# Patient Record
Sex: MALE | Race: WHITE | NOT HISPANIC OR LATINO | Employment: FULL TIME | URBAN - METROPOLITAN AREA
[De-identification: names, ages, dates, MRNs, and addresses within clinical notes are randomized per-mention and may not be internally consistent; named-entity substitution may affect disease eponyms.]

---

## 2021-05-25 DIAGNOSIS — Z11.59 ENCOUNTER FOR SCREENING FOR OTHER VIRAL DISEASES: ICD-10-CM

## 2021-06-11 DIAGNOSIS — Z11.59 ENCOUNTER FOR SCREENING FOR OTHER VIRAL DISEASES: ICD-10-CM

## 2021-06-11 LAB
LABORATORY COMMENT REPORT: NORMAL
SARS-COV-2 RNA RESP QL NAA+PROBE: NEGATIVE
SARS-COV-2 RNA RESP QL NAA+PROBE: NORMAL
SPECIMEN SOURCE: NORMAL
SPECIMEN SOURCE: NORMAL

## 2021-06-11 PROCEDURE — U0003 INFECTIOUS AGENT DETECTION BY NUCLEIC ACID (DNA OR RNA); SEVERE ACUTE RESPIRATORY SYNDROME CORONAVIRUS 2 (SARS-COV-2) (CORONAVIRUS DISEASE [COVID-19]), AMPLIFIED PROBE TECHNIQUE, MAKING USE OF HIGH THROUGHPUT TECHNOLOGIES AS DESCRIBED BY CMS-2020-01-R: HCPCS | Performed by: UROLOGY

## 2021-06-11 PROCEDURE — U0005 INFEC AGEN DETEC AMPLI PROBE: HCPCS | Performed by: UROLOGY

## 2021-06-14 RX ORDER — TAMSULOSIN HYDROCHLORIDE 0.4 MG/1
0.4 CAPSULE ORAL EVERY MORNING
COMMUNITY

## 2021-06-14 RX ORDER — FINASTERIDE 5 MG/1
5 TABLET, FILM COATED ORAL EVERY MORNING
COMMUNITY

## 2021-06-14 RX ORDER — ATORVASTATIN CALCIUM 20 MG/1
20 TABLET, FILM COATED ORAL EVERY EVENING
COMMUNITY

## 2021-06-14 NOTE — PROGRESS NOTES
PTA medications updated by Medication Scribe prior to surgery via phone call with patient (last doses completed by Nurse)     Medication history sources: Patient, Surescripts and H&P  In the past week, patient estimated taking medication this percent of the time: Greater than 90%  Adherence assessment: N/A Not Observed    Significant changes made to the medication list:  None      Additional medication history information:   None    Medication reconciliation completed by provider prior to medication history? No    Time spent in this activity: 15 minutes    The information provided in this note is only as accurate as the sources available at the time of update(s)      Prior to Admission medications    Medication Sig Last Dose Taking? Auth Provider   Ascorbic Acid (VITAMIN C PO) Take 1 Dose by mouth daily 6/10/2021 Yes Reported, Patient   atorvastatin (LIPITOR) 20 MG tablet Take 20 mg by mouth every evening  at pm Yes Reported, Patient   CRANBERRY PO Take 1 Dose by mouth daily 6/10/2021 Yes Reported, Patient   finasteride (PROSCAR) 5 MG tablet Take 5 mg by mouth every morning  at am Yes Reported, Patient   hydrochlorothiazide (MICROZIDE) 12.5 MG capsule Take 12.5 mg by mouth every morning   at am Yes Reported, Patient   lisinopril (ZESTRIL) 5 MG tablet Take 5 mg by mouth daily  6/14/2021 at am Yes Reported, Patient   tamsulosin (FLOMAX) 0.4 MG capsule Take 0.4 mg by mouth every morning  at am Yes Reported, Patient

## 2021-06-15 ENCOUNTER — ANESTHESIA EVENT (OUTPATIENT)
Dept: SURGERY | Facility: CLINIC | Age: 51
End: 2021-06-15
Payer: COMMERCIAL

## 2021-06-15 ENCOUNTER — ANESTHESIA (OUTPATIENT)
Dept: SURGERY | Facility: CLINIC | Age: 51
End: 2021-06-15
Payer: COMMERCIAL

## 2021-06-15 ENCOUNTER — HOSPITAL ENCOUNTER (OUTPATIENT)
Facility: CLINIC | Age: 51
Discharge: HOME | End: 2021-06-16
Attending: UROLOGY | Admitting: UROLOGY
Payer: COMMERCIAL

## 2021-06-15 DIAGNOSIS — N32.0 BLADDER NECK OBSTRUCTION: Primary | ICD-10-CM

## 2021-06-15 LAB — POTASSIUM SERPL-SCNC: 3.5 MMOL/L (ref 3.4–5.3)

## 2021-06-15 PROCEDURE — 250N000011 HC RX IP 250 OP 636: Performed by: NURSE ANESTHETIST, CERTIFIED REGISTERED

## 2021-06-15 PROCEDURE — 250N000011 HC RX IP 250 OP 636: Performed by: ANESTHESIOLOGY

## 2021-06-15 PROCEDURE — 84132 ASSAY OF SERUM POTASSIUM: CPT | Performed by: ANESTHESIOLOGY

## 2021-06-15 PROCEDURE — 370N000017 HC ANESTHESIA TECHNICAL FEE, PER MIN: Performed by: UROLOGY

## 2021-06-15 PROCEDURE — 258N000003 HC RX IP 258 OP 636: Performed by: ANESTHESIOLOGY

## 2021-06-15 PROCEDURE — 88305 TISSUE EXAM BY PATHOLOGIST: CPT | Mod: 26 | Performed by: PATHOLOGY

## 2021-06-15 PROCEDURE — 250N000011 HC RX IP 250 OP 636: Performed by: PHYSICIAN ASSISTANT

## 2021-06-15 PROCEDURE — 710N000009 HC RECOVERY PHASE 1, LEVEL 1, PER MIN: Performed by: UROLOGY

## 2021-06-15 PROCEDURE — 250N000009 HC RX 250: Performed by: UROLOGY

## 2021-06-15 PROCEDURE — 36415 COLL VENOUS BLD VENIPUNCTURE: CPT | Performed by: ANESTHESIOLOGY

## 2021-06-15 PROCEDURE — C1769 GUIDE WIRE: HCPCS | Performed by: UROLOGY

## 2021-06-15 PROCEDURE — 258N000003 HC RX IP 258 OP 636: Performed by: NURSE ANESTHETIST, CERTIFIED REGISTERED

## 2021-06-15 PROCEDURE — 999N000141 HC STATISTIC PRE-PROCEDURE NURSING ASSESSMENT: Performed by: UROLOGY

## 2021-06-15 PROCEDURE — 360N000076 HC SURGERY LEVEL 3, PER MIN: Performed by: UROLOGY

## 2021-06-15 PROCEDURE — 250N000025 HC SEVOFLURANE, PER MIN: Performed by: UROLOGY

## 2021-06-15 PROCEDURE — 88305 TISSUE EXAM BY PATHOLOGIST: CPT | Mod: TC | Performed by: UROLOGY

## 2021-06-15 PROCEDURE — 258N000003 HC RX IP 258 OP 636: Performed by: UROLOGY

## 2021-06-15 PROCEDURE — 272N000001 HC OR GENERAL SUPPLY STERILE: Performed by: UROLOGY

## 2021-06-15 PROCEDURE — 250N000009 HC RX 250: Performed by: NURSE ANESTHETIST, CERTIFIED REGISTERED

## 2021-06-15 RX ORDER — SODIUM CHLORIDE, SODIUM LACTATE, POTASSIUM CHLORIDE, CALCIUM CHLORIDE 600; 310; 30; 20 MG/100ML; MG/100ML; MG/100ML; MG/100ML
INJECTION, SOLUTION INTRAVENOUS CONTINUOUS PRN
Status: DISCONTINUED | OUTPATIENT
Start: 2021-06-15 | End: 2021-06-15

## 2021-06-15 RX ORDER — BUPIVACAINE HYDROCHLORIDE 7.5 MG/ML
INJECTION, SOLUTION INTRASPINAL
Status: COMPLETED | OUTPATIENT
Start: 2021-06-15 | End: 2021-06-15

## 2021-06-15 RX ORDER — HYDRALAZINE HYDROCHLORIDE 20 MG/ML
2.5-5 INJECTION INTRAMUSCULAR; INTRAVENOUS EVERY 10 MIN PRN
Status: DISCONTINUED | OUTPATIENT
Start: 2021-06-15 | End: 2021-06-15 | Stop reason: HOSPADM

## 2021-06-15 RX ORDER — PROPOFOL 10 MG/ML
INJECTION, EMULSION INTRAVENOUS CONTINUOUS PRN
Status: DISCONTINUED | OUTPATIENT
Start: 2021-06-15 | End: 2021-06-15

## 2021-06-15 RX ORDER — SODIUM CHLORIDE, SODIUM LACTATE, POTASSIUM CHLORIDE, CALCIUM CHLORIDE 600; 310; 30; 20 MG/100ML; MG/100ML; MG/100ML; MG/100ML
INJECTION, SOLUTION INTRAVENOUS CONTINUOUS
Status: DISCONTINUED | OUTPATIENT
Start: 2021-06-15 | End: 2021-06-16 | Stop reason: HOSPADM

## 2021-06-15 RX ORDER — LIDOCAINE 40 MG/G
CREAM TOPICAL
Status: DISCONTINUED | OUTPATIENT
Start: 2021-06-15 | End: 2021-06-16 | Stop reason: HOSPADM

## 2021-06-15 RX ORDER — ONDANSETRON 4 MG/1
4 TABLET, ORALLY DISINTEGRATING ORAL EVERY 30 MIN PRN
Status: DISCONTINUED | OUTPATIENT
Start: 2021-06-15 | End: 2021-06-15 | Stop reason: HOSPADM

## 2021-06-15 RX ORDER — LABETALOL HYDROCHLORIDE 5 MG/ML
10 INJECTION, SOLUTION INTRAVENOUS
Status: DISCONTINUED | OUTPATIENT
Start: 2021-06-15 | End: 2021-06-15 | Stop reason: HOSPADM

## 2021-06-15 RX ORDER — MEPERIDINE HYDROCHLORIDE 25 MG/ML
12.5 INJECTION INTRAMUSCULAR; INTRAVENOUS; SUBCUTANEOUS EVERY 5 MIN PRN
Status: DISCONTINUED | OUTPATIENT
Start: 2021-06-15 | End: 2021-06-15 | Stop reason: HOSPADM

## 2021-06-15 RX ORDER — NALOXONE HYDROCHLORIDE 0.4 MG/ML
0.4 INJECTION, SOLUTION INTRAMUSCULAR; INTRAVENOUS; SUBCUTANEOUS
Status: DISCONTINUED | OUTPATIENT
Start: 2021-06-15 | End: 2021-06-15

## 2021-06-15 RX ORDER — NALOXONE HYDROCHLORIDE 0.4 MG/ML
0.2 INJECTION, SOLUTION INTRAMUSCULAR; INTRAVENOUS; SUBCUTANEOUS
Status: DISCONTINUED | OUTPATIENT
Start: 2021-06-15 | End: 2021-06-16 | Stop reason: HOSPADM

## 2021-06-15 RX ORDER — ONDANSETRON 2 MG/ML
INJECTION INTRAMUSCULAR; INTRAVENOUS PRN
Status: DISCONTINUED | OUTPATIENT
Start: 2021-06-15 | End: 2021-06-15

## 2021-06-15 RX ORDER — CEFAZOLIN SODIUM 2 G/100ML
2 INJECTION, SOLUTION INTRAVENOUS
Status: COMPLETED | OUTPATIENT
Start: 2021-06-15 | End: 2021-06-15

## 2021-06-15 RX ORDER — ATROPA BELLADONNA AND OPIUM 16.2; 3 MG/1; MG/1
SUPPOSITORY RECTAL PRN
Status: DISCONTINUED | OUTPATIENT
Start: 2021-06-15 | End: 2021-06-15 | Stop reason: HOSPADM

## 2021-06-15 RX ORDER — SODIUM CHLORIDE, SODIUM LACTATE, POTASSIUM CHLORIDE, CALCIUM CHLORIDE 600; 310; 30; 20 MG/100ML; MG/100ML; MG/100ML; MG/100ML
INJECTION, SOLUTION INTRAVENOUS CONTINUOUS
Status: DISCONTINUED | OUTPATIENT
Start: 2021-06-15 | End: 2021-06-15 | Stop reason: HOSPADM

## 2021-06-15 RX ORDER — KETOROLAC TROMETHAMINE 30 MG/ML
30 INJECTION, SOLUTION INTRAMUSCULAR; INTRAVENOUS EVERY 6 HOURS PRN
Status: DISCONTINUED | OUTPATIENT
Start: 2021-06-15 | End: 2021-06-16 | Stop reason: HOSPADM

## 2021-06-15 RX ORDER — NALOXONE HYDROCHLORIDE 0.4 MG/ML
0.4 INJECTION, SOLUTION INTRAMUSCULAR; INTRAVENOUS; SUBCUTANEOUS
Status: DISCONTINUED | OUTPATIENT
Start: 2021-06-15 | End: 2021-06-16 | Stop reason: HOSPADM

## 2021-06-15 RX ORDER — EPHEDRINE SULFATE 50 MG/ML
INJECTION, SOLUTION INTRAMUSCULAR; INTRAVENOUS; SUBCUTANEOUS PRN
Status: DISCONTINUED | OUTPATIENT
Start: 2021-06-15 | End: 2021-06-15

## 2021-06-15 RX ORDER — ACETAMINOPHEN 325 MG/1
650 TABLET ORAL EVERY 6 HOURS PRN
Status: DISCONTINUED | OUTPATIENT
Start: 2021-06-15 | End: 2021-06-16 | Stop reason: HOSPADM

## 2021-06-15 RX ORDER — ONDANSETRON 2 MG/ML
4 INJECTION INTRAMUSCULAR; INTRAVENOUS EVERY 6 HOURS PRN
Status: DISCONTINUED | OUTPATIENT
Start: 2021-06-15 | End: 2021-06-16 | Stop reason: HOSPADM

## 2021-06-15 RX ORDER — FENTANYL CITRATE 50 UG/ML
25-50 INJECTION, SOLUTION INTRAMUSCULAR; INTRAVENOUS
Status: DISCONTINUED | OUTPATIENT
Start: 2021-06-15 | End: 2021-06-15 | Stop reason: HOSPADM

## 2021-06-15 RX ORDER — HYDROMORPHONE HYDROCHLORIDE 1 MG/ML
.3-.5 INJECTION, SOLUTION INTRAMUSCULAR; INTRAVENOUS; SUBCUTANEOUS EVERY 5 MIN PRN
Status: DISCONTINUED | OUTPATIENT
Start: 2021-06-15 | End: 2021-06-15 | Stop reason: HOSPADM

## 2021-06-15 RX ORDER — HYDROCODONE BITARTRATE AND ACETAMINOPHEN 5; 325 MG/1; MG/1
1-2 TABLET ORAL EVERY 4 HOURS PRN
Status: DISCONTINUED | OUTPATIENT
Start: 2021-06-15 | End: 2021-06-16 | Stop reason: HOSPADM

## 2021-06-15 RX ORDER — ONDANSETRON 4 MG/1
4 TABLET, ORALLY DISINTEGRATING ORAL EVERY 6 HOURS PRN
Status: DISCONTINUED | OUTPATIENT
Start: 2021-06-15 | End: 2021-06-16 | Stop reason: HOSPADM

## 2021-06-15 RX ORDER — FENTANYL CITRATE 50 UG/ML
25-100 INJECTION, SOLUTION INTRAMUSCULAR; INTRAVENOUS
Status: DISCONTINUED | OUTPATIENT
Start: 2021-06-15 | End: 2021-06-15 | Stop reason: HOSPADM

## 2021-06-15 RX ORDER — LIDOCAINE 40 MG/G
CREAM TOPICAL
Status: DISCONTINUED | OUTPATIENT
Start: 2021-06-15 | End: 2021-06-15 | Stop reason: HOSPADM

## 2021-06-15 RX ORDER — ONDANSETRON 2 MG/ML
4 INJECTION INTRAMUSCULAR; INTRAVENOUS EVERY 30 MIN PRN
Status: DISCONTINUED | OUTPATIENT
Start: 2021-06-15 | End: 2021-06-15 | Stop reason: HOSPADM

## 2021-06-15 RX ORDER — NALOXONE HYDROCHLORIDE 0.4 MG/ML
0.2 INJECTION, SOLUTION INTRAMUSCULAR; INTRAVENOUS; SUBCUTANEOUS
Status: DISCONTINUED | OUTPATIENT
Start: 2021-06-15 | End: 2021-06-15

## 2021-06-15 RX ADMIN — MIDAZOLAM 2 MG: 1 INJECTION INTRAMUSCULAR; INTRAVENOUS at 07:29

## 2021-06-15 RX ADMIN — PROPOFOL 100 MCG/KG/MIN: 10 INJECTION, EMULSION INTRAVENOUS at 07:38

## 2021-06-15 RX ADMIN — Medication 0.5 UNITS: at 08:29

## 2021-06-15 RX ADMIN — PHENYLEPHRINE HYDROCHLORIDE 200 MCG: 10 INJECTION INTRAVENOUS at 08:09

## 2021-06-15 RX ADMIN — CEFAZOLIN SODIUM 2 G: 2 INJECTION, SOLUTION INTRAVENOUS at 07:39

## 2021-06-15 RX ADMIN — Medication 0.5 UNITS: at 08:19

## 2021-06-15 RX ADMIN — BUPIVACAINE HYDROCHLORIDE IN DEXTROSE 1.4 ML: 7.5 INJECTION, SOLUTION SUBARACHNOID at 08:06

## 2021-06-15 RX ADMIN — PHENYLEPHRINE HYDROCHLORIDE 100 MCG: 10 INJECTION INTRAVENOUS at 07:53

## 2021-06-15 RX ADMIN — ONDANSETRON 4 MG: 2 INJECTION INTRAMUSCULAR; INTRAVENOUS at 07:55

## 2021-06-15 RX ADMIN — Medication 0.5 UNITS: at 08:23

## 2021-06-15 RX ADMIN — PHENYLEPHRINE HYDROCHLORIDE 200 MCG: 10 INJECTION INTRAVENOUS at 07:59

## 2021-06-15 RX ADMIN — PHENYLEPHRINE HYDROCHLORIDE 200 MCG: 10 INJECTION INTRAVENOUS at 08:07

## 2021-06-15 RX ADMIN — Medication 10 MG: at 08:15

## 2021-06-15 RX ADMIN — SODIUM CHLORIDE, POTASSIUM CHLORIDE, SODIUM LACTATE AND CALCIUM CHLORIDE: 600; 310; 30; 20 INJECTION, SOLUTION INTRAVENOUS at 19:18

## 2021-06-15 RX ADMIN — SODIUM CHLORIDE, POTASSIUM CHLORIDE, SODIUM LACTATE AND CALCIUM CHLORIDE: 600; 310; 30; 20 INJECTION, SOLUTION INTRAVENOUS at 10:49

## 2021-06-15 RX ADMIN — SODIUM CHLORIDE, POTASSIUM CHLORIDE, SODIUM LACTATE AND CALCIUM CHLORIDE: 600; 310; 30; 20 INJECTION, SOLUTION INTRAVENOUS at 12:28

## 2021-06-15 RX ADMIN — SODIUM CHLORIDE, POTASSIUM CHLORIDE, SODIUM LACTATE AND CALCIUM CHLORIDE: 600; 310; 30; 20 INJECTION, SOLUTION INTRAVENOUS at 07:27

## 2021-06-15 RX ADMIN — Medication 10 MG: at 08:05

## 2021-06-15 RX ADMIN — FENTANYL CITRATE 50 MCG: 50 INJECTION, SOLUTION INTRAMUSCULAR; INTRAVENOUS at 07:29

## 2021-06-15 RX ADMIN — Medication 0.5 UNITS: at 08:27

## 2021-06-15 ASSESSMENT — MIFFLIN-ST. JEOR: SCORE: 1934.94

## 2021-06-15 NOTE — ANESTHESIA PROCEDURE NOTES
Intrathecal Procedure Note  Pre-Procedure   Staff -        Anesthesiologist:  Francisco Bell MD       Performed By: anesthesiologist       Location: OR       Pre-Anesthestic Checklist: patient identified, IV checked, risks and benefits discussed, informed consent, monitors and equipment checked and pre-op evaluation  Timeout:       Correct Patient: Yes        Correct Procedure: Yes        Correct Position: Yes   Procedure Documentation  Procedure: intrathecal       Patient Position: sitting       Patient Prep/Sterile Barriers: sterile gloves, mask, patient draped, 3 rounds of betadine.  Waited for it to completely dry prior to procedure       Skin prep: Betadine       Insertion Site: L4-5. (midline approach).       Spinal Needle Type: Laura-Luke       Introducer used      # of attempts: 1 and  # of redirects:     Assessment/Narrative         Paresthesias: No.       CSF fluid: clear.    Medication(s) Administered   0.75% Hyperbaric Bupivacaine (Intrathecal), 1.4 mL  Comments:  1% lidocaine to skin  No complications

## 2021-06-15 NOTE — ANESTHESIA CARE TRANSFER NOTE
Patient: Yosvany Mcdonnell    Procedure(s):  TRANSURETHRAL RESECTION OF PROSTATE    Diagnosis: Chadwick hematuria [R31.0]  Diagnosis Additional Information: No value filed.    Anesthesia Type:   Spinal     Note:    Oropharynx: oropharynx clear of all foreign objects  Level of Consciousness: awake  Oxygen Supplementation: face mask  Level of Supplemental Oxygen (L/min / FiO2): 6  Independent Airway: airway patency satisfactory and stable  Dentition: dentition unchanged  Vital Signs Stable: post-procedure vital signs reviewed and stable  Report to RN Given: handoff report given  Patient transferred to: PACU  Comments: Pt exhibits spont resps, all monitors and alarms on in pacu, report given to RN, vss.  Handoff Report: Identifed the Patient, Identified the Reponsible Provider, Reviewed the pertinent medical history, Discussed the surgical course, Reviewed Intra-OP anesthesia mangement and issues during anesthesia, Set expectations for post-procedure period and Allowed opportunity for questions and acknowledgement of understanding      Vitals: (Last set prior to Anesthesia Care Transfer)  CRNA VITALS  6/15/2021 0801 - 6/15/2021 0838      6/15/2021             NIBP:  116/52    NIBP Mean:  62    Resp Rate (set):  10        Electronically Signed By: LESLIE Sosa CRNA  Rochelle 15, 2021  8:38 AM

## 2021-06-15 NOTE — ANESTHESIA POSTPROCEDURE EVALUATION
Patient: Yosvany Mcdonnell    Procedure(s):  TRANSURETHRAL RESECTION OF PROSTATE    Diagnosis:Chadwick hematuria [R31.0]  Diagnosis Additional Information: No value filed.    Anesthesia Type:  Spinal    Note:  Disposition: Inpatient   Postop Pain Control: Uneventful            Sign Out: Well controlled pain   PONV: No   Neuro/Psych: Uneventful            Sign Out: Acceptable/Baseline neuro status   Airway/Respiratory: Uneventful            Sign Out: Acceptable/Baseline resp. status   CV/Hemodynamics: Uneventful            Sign Out: Acceptable CV status; No obvious hypovolemia; No obvious fluid overload   Other NRE: NONE   DID A NON-ROUTINE EVENT OCCUR? No           Last vitals:  Vitals:    06/15/21 1115 06/15/21 1130 06/15/21 1203   BP: 109/79 114/71 117/68   Pulse: 54 52 67   Resp: 15 15 12   Temp:   36.3  C (97.4  F)   SpO2: 98% 97% 97%       Last vitals prior to Anesthesia Care Transfer:  CRNA VITALS  6/15/2021 0801 - 6/15/2021 0901      6/15/2021             NIBP:  116/52    NIBP Mean:  62    Resp Rate (set):  10          Electronically Signed By: Francisco Bell MD  Rochelle 15, 2021  12:14 PM

## 2021-06-15 NOTE — PLAN OF CARE
Arrived from PACU around 1200. VSS. No complaints of pain. Minimal bladder spasm. CBI running slow with yellow to pink output. Tingling in feet improving. Full sensation otherwise. CMS intact. Tolerated clears for lunch, plans to try some food this afternoon. Using IS independently. Plan for ambulation this afternoon. Likely home tomorrow.

## 2021-06-15 NOTE — PROCEDURES
[facility] Urology Brief Operative Note    Pre-operative diagnosis: Bladder outlet obstruction 2nd to a constrictive prostate   Post-operative diagnosis: Same, with significant BPH noted   Procedure: Procedure(s):  TRANSURETHRAL RESECTION OF PROSTATE   Surgeon: Timo Perkins MD   Assistant(s): None   Anesthesia: Spinal Anesthesia   Estimated blood loss: 2 ml   Total IV fluids: (See anesthesia record)   Blood transfusion: No transfusion was given during surgery   Total urine output: (See anesthesia record)   Drains: Moreland catheter   Specimens: None   Implants: None   Findings: See op note   Complications: None   Condition: Stable   Comments: See dictated operative report for full details

## 2021-06-15 NOTE — OP NOTE
Procedure Date: 06/15/2021    PREOPERATIVE DIAGNOSES:  Bladder outlet obstruction secondary to benign prostatic hyperplasia.    POSTOPERATIVE DIAGNOSES:  Bladder outlet obstruction secondary to benign prostatic hyperplasia, with more significant BPH noted then, initially observed on office cystoscopy.    PROCEDURE:     1.  Cystoscopy.  2.  Urethral dilation.  3.  Transurethral resection of the prostate.    ANESTHESIA:  Spinal.    INTRAVENOUS FLUIDS:  200 mL    DESCRIPTION OF PROCEDURE:  The patient was prepped and draped in the dorsal lithotomy position under satisfactory general anesthetic.  With the multidisciplinary timeout observed, a 22-Guyanese cystoscope was advanced per meatus.  Initially, there appeared to be what was observed in the office, that of a constricted bladder neck.  The bladder was inspected with a 30 and 70-degree lens and ureteral orifices were orthotopic.  There was a large caliber.  There was a large wide mouth left sided bladder diverticulum and this was inspected in its entirety and the bladder mucosa was normal within both the diverticulum and their entire bladder itself.    I dilated the lower urinary tract over a Super Stiff Amplatz wire, which was passed after backloading the wire off of the cystoscope.  The dilation was atraumatic and taken to 30-Guyanese.    This allowed safe passage of a 26-Guyanese resectoscope using glycine as irrigant.  My initial plan was to simply perform a transurethral incision of the prostate and I began doing this by incising at the 3, 6, and 9 o'clock position after instilling the bladder with 150 mL of glycine.  However, it became obvious as I incised into the bladder neck and towards the verumontanum, but not beyond this landmark, that the patient had not only constrictive but compressive BPH due to a sizable amount of adenomatous appearing BPH tissue.  I then switched out the Torre knife to the hot loop and resected a limited amount of prostate tissue from  the prostatic urethra.  When I was finished with that a wide open prostatic fossa was observed and the ureteral orifices were intact.  I used a rollerball cautery to achieve and maintain hemostasis, which was excellent throughout the procedure.    All the prostate chips were then suctioned from the bladder using the Urovac and sent to surgical pathology.    I placed a 22-Polish 3-way Moreland and the returns were clear.     The patient left the operating room in stable condition.    I did speak to the patient's mother who thought he would be going home today and explained to her that there was a change in plans at the time of the surgery and we will do a 23-hour overnight admission and run him to [facility].    The patient tolerated the procedure well and left the operating room in stable condition as stated.    Timo Perkins MD        D: 06/15/2021   T: 06/15/2021   MT: [id number]    Name:     NADEEM BALLARD  MRN:      [id number]        Account:        [id number]   :      1970           Procedure Date: 06/15/2021     Document: [id number]    cc:  MD Nicholas Chamberlain MD

## 2021-06-15 NOTE — ANESTHESIA PREPROCEDURE EVALUATION
Anesthesia Pre-Procedure Evaluation    Patient: Yosvany Mcdonnell   MRN: [id number] : 1970        Preoperative Diagnosis: Chadwick hematuria [R31.0]   Procedure : Procedure(s):  TRANSURETHRAL INCISION OF PROSTATE  AND BLADDER NECK     Past Medical History:   Diagnosis Date     Hematuria      Hypertension      Renal disease     stones      Past Surgical History:   Procedure Laterality Date     CYSTOSCOPY, BIOPSY BLADDER, COMBINED N/A 2016    Procedure: COMBINED CYSTOSCOPY, BIOPSY BLADDER;  Surgeon: Timo Perkins MD;  Location: [facility] OR     CYSTOSCOPY, FULGURATE BLADDER TUMOR, COMBINED N/A 2016    Procedure: COMBINED CYSTOSCOPY, FULGURATE BLADDER TUMOR;  Surgeon: Timo Perkins MD;  Location: [facility] OR     GENITOURINARY SURGERY      cystoscopy     HERNIA REPAIR      as infant     SOFT TISSUE SURGERY      pilonidal cyst      No Known Allergies   Social History     Tobacco Use     Smoking status: Never Smoker     Smokeless tobacco: Never Used   Substance Use Topics     Alcohol use: No      Wt Readings from Last 1 Encounters:   06/15/21 108 kg (238 lb)        Anesthesia Evaluation   Pt has had prior anesthetic.     No history of anesthetic complications       ROS/MED HX  ENT/Pulmonary:    (-) sleep apnea   Neurologic:       Cardiovascular:     (+) Dyslipidemia hypertension-range: controlled/ ----    METS/Exercise Tolerance:     Hematologic:       Musculoskeletal:       GI/Hepatic:    (-) GERD   Renal/Genitourinary:     (+) BPH,     Endo:     (+) Obesity (BMI 35),     Psychiatric/Substance Use:       Infectious Disease:       Malignancy:       Other:            Physical Exam    Airway        Mallampati: II   TM distance: > 3 FB   Neck ROM: full   Mouth opening: > 3 cm    Respiratory Devices and Support         Dental  no notable dental history         Cardiovascular   cardiovascular exam normal          Pulmonary   pulmonary exam normal                OUTSIDE LABS:  CBC: No results found for: WBC, HGB, HCT,  PLT  BMP: No results found for: NA, POTASSIUM, CHLORIDE, CO2, BUN, CR, GLC  COAGS: No results found for: PTT, INR, FIBR  POC: No results found for: BGM, HCG, HCGS  HEPATIC: No results found for: ALBUMIN, PROTTOTAL, ALT, AST, GGT, ALKPHOS, BILITOTAL, BILIDIRECT, OTIS  OTHER: No results found for: PH, LACT, A1C, DAVID, PHOS, MAG, LIPASE, AMYLASE, TSH, T4, T3, CRP, SED    Anesthesia Plan    ASA Status:  2   NPO Status:  NPO Appropriate    Anesthesia Type: Spinal.              Consents    Anesthesia Plan(s) and associated risks, benefits, and realistic alternatives discussed. Questions answered and patient/representative(s) expressed understanding.     - Discussed with:  Patient         Postoperative Care    Pain management: IV analgesics, Multi-modal analgesia.   PONV prophylaxis: Ondansetron (or other 5HT-3), Dexamethasone or Solumedrol     Comments:    H&P Reviewed            Francisco Bell MD

## 2021-06-16 VITALS
OXYGEN SATURATION: 94 % | TEMPERATURE: 97.4 F | BODY MASS INDEX: 35.25 KG/M2 | DIASTOLIC BLOOD PRESSURE: 72 MMHG | WEIGHT: 238 LBS | HEART RATE: 84 BPM | SYSTOLIC BLOOD PRESSURE: 115 MMHG | HEIGHT: 69 IN | RESPIRATION RATE: 12 BRPM

## 2021-06-16 LAB
COPATH REPORT: NORMAL
GLUCOSE BLDC GLUCOMTR-MCNC: 112 MG/DL (ref 70–99)

## 2021-06-16 PROCEDURE — 258N000003 HC RX IP 258 OP 636: Performed by: UROLOGY

## 2021-06-16 PROCEDURE — 82962 GLUCOSE BLOOD TEST: CPT

## 2021-06-16 RX ORDER — ACETAMINOPHEN 325 MG/1
650 TABLET ORAL EVERY 6 HOURS PRN
Start: 2021-06-16

## 2021-06-16 RX ADMIN — SODIUM CHLORIDE, POTASSIUM CHLORIDE, SODIUM LACTATE AND CALCIUM CHLORIDE: 600; 310; 30; 20 INJECTION, SOLUTION INTRAVENOUS at 03:00

## 2021-06-16 NOTE — PLAN OF CARE
A&Ox4. VSS on RA. Denies pain, N/V. CBI running at slow rate with yellow to pink output. Plan to clamp CBI @ 6am per POC. R PIV infusing LR @ 125 mL/hr. Regular diet, tolerating well. Hypo BS, + flatus. Plan to discharge home tomorrow.

## 2021-06-16 NOTE — PLAN OF CARE
[id number]:  POD #1.  A&Ox4.  VSS, RA; Capno WNL.  Denies pain.  Moreland in-place with CBI (clamped @ 0600 this AM); clear yellow urine.  PIV infusing LR @ 125 mL/hr.  Tolerating regular diet without problems.  Independent.  Hypoactive BS, + flatus.  Discharge pending, probably today

## 2021-06-16 NOTE — PROGRESS NOTES
Urology   Slept well; his urine has remained brett clear since CBI clamped a few hours ago.   VSS, AF     Abd: soft, non distended.     POD # 1 s/p TURP     Plan: Remove enriquez, discharge this am.   F/U: in [place] [facility] late July.     Maddie

## 2021-06-16 NOTE — PROGRESS NOTES
VSS. No complaints of pain. Moreland removed at 0800. Voided without difficulty. PVR WDL. Tolerating diet. Passing gas. Up independently. Discharge completed by another floor RN. Spoke with RUSTAM Grey at 1045, wondering if patient needed antibiotic at discharge? PA will clarify with MD, ok to discharge now and PA will follow up with patient if need be.

## 2021-06-16 NOTE — PLAN OF CARE
Pt discharged to home with mother providing transportation.  Pt had all belongings at time of discharge.  Writer reviewed discharge instructions with pt and he expressed understanding.  Pt left floor via wheelchair with NA.

## 2021-06-19 ENCOUNTER — HEALTH MAINTENANCE LETTER (OUTPATIENT)
Age: 51
End: 2021-06-19

## 2021-09-05 ENCOUNTER — HOSPITAL ENCOUNTER (EMERGENCY)
Facility: CLINIC | Age: 51
Discharge: HOME | End: 2021-09-05
Attending: EMERGENCY MEDICINE | Admitting: EMERGENCY MEDICINE
Payer: COMMERCIAL

## 2021-09-05 ENCOUNTER — TRANSFERRED RECORDS (OUTPATIENT)
Dept: HEALTH INFORMATION MANAGEMENT | Facility: CLINIC | Age: 51
End: 2021-09-05

## 2021-09-05 VITALS
RESPIRATION RATE: 18 BRPM | SYSTOLIC BLOOD PRESSURE: 119 MMHG | DIASTOLIC BLOOD PRESSURE: 75 MMHG | HEART RATE: 76 BPM | TEMPERATURE: 98.4 F | OXYGEN SATURATION: 100 %

## 2021-09-05 DIAGNOSIS — R31.0 GROSS HEMATURIA: ICD-10-CM

## 2021-09-05 PROCEDURE — 99282 EMERGENCY DEPT VISIT SF MDM: CPT

## 2021-09-05 RX ORDER — CEPHALEXIN 500 MG/1
500 CAPSULE ORAL 2 TIMES DAILY
Qty: 14 CAPSULE | Refills: 0 | Status: SHIPPED | OUTPATIENT
Start: 2021-09-05 | End: 2021-09-12

## 2021-09-05 ASSESSMENT — ENCOUNTER SYMPTOMS
CHILLS: 0
HEMATURIA: 1
ABDOMINAL PAIN: 0
FLANK PAIN: 0
DYSURIA: 0
FEVER: 0

## 2021-09-05 NOTE — ED TRIAGE NOTES
Patient sent to the ED from urgent care with hematuria. States had a TURP 6/15. Reports things had been going well, but began noticing blood in the urine last night.

## 2021-09-05 NOTE — ED PROVIDER NOTES
History   Chief Complaint:  Hematuria       The history is provided by the patient and medical records.      Yosvany Mcdonnell is a 50 year old male with history of a TURP on 06/15/21, hematuria, renal disease, and a bladder neck obstruction who presents with hematuria. Since his TURP in June, Yosvany reports things to have been well. Last night around 1800, he passed what he believed to be was a clot and later around 2015, he reports his urine to have been a pinkish color, assuming there was blood present. He presented to urgent care this morning with hematuria and received a urinalysis. He was then prompted to visit the ED. He denies experiencing any back pain, flank pain, dysuria, fever, or rigors.     Urine Culture - 09/05/2021   Color: red; Appearance: bloody; pH: 6.5; Blood: 3+; SG: >=1.030 (H); Protein: 3+ (H); Leuko Esterase: 3+ (H); WBC: 0; RBC: >100 (H); Bacteria: 1+; o/w WNL     Review of Systems   Constitutional: Negative for chills and fever.   Gastrointestinal: Negative for abdominal pain.   Genitourinary: Positive for hematuria. Negative for dysuria and flank pain.   All other systems reviewed and are negative.        Allergies:  The patient has no known allergies.     Medications:  Finasteride  Atorvastatin  Lisinopril  Tamsulosin  Ascorbic acid   Hydrochlorothiazide      Past Medical History:    Hematuria  Hypertension  Renal disease  Bladder neck obstruction     Past Surgical History:    Fulgurate bladder tumor  Transurethral resection  Genitourinary surgery  Hernia repair  Soft tissue surgery, pilonidal cyst     Family History:    Mother - CAD  Brother - DVT  Father - hydrocephalus, hypertension    Social History:  The patient is unaccompanied in the ED today.    Physical Exam     Patient Vitals for the past 24 hrs:   BP Temp Pulse Resp SpO2   09/05/21 1011 129/88 98.4  F (36.9  C) 81 18 99 %       Physical Exam    Nursing note and vitals reviewed.  HENT:   Mouth/Throat: Moist mucous membranes.    Eyes: EOMI, nonicteric sclera  Cardiovascular: Normal rate.   Pulmonary/Chest: Effort normal.  Speaking in complete sentences.  Abdominal: Soft. Nontender, nondistended, no guarding or rigidity. No CVA tenderness.   Musculoskeletal: Normal range of motion.   Neurological: Alert. Moves all extremities spontaneously.   Skin: Skin is warm and dry. No rash noted.   Psychiatric: Normal mood and affect.       Emergency Department Course     Procedures  None    Emergency Department Course:    Reviewed:  I reviewed nursing notes, vitals, past medical history and care everywhere    Assessments:  1027 I obtained history and examined the patient as noted above.     1158 I rechecked the patient and explained findings.     Consults:   1047 I consulted with the patient's urologist, Dr. Pinzon.    Disposition:  The patient was discharged to home.       Impression & Plan     Medical Decision Making:  Patient presents from urgent care with chief complaint hematuria.  Patient has had this complaint in the past.  He was reportedly sent from urgent care out of need for imaging, the patient has had this problem in the past and has had extensive work-up from urology.  Patient reports that he is still able to void, and denies any abdominal or flank pain.  Urinalysis performed at urgent care does not have any white blood cells, but does suggest bacteria as well as leukocyte esterase.  I think it is reasonable to treat this as possible infection with Keflex which we prescribed.  Discussed with patient that I did not believe any intervention was indicated at this time, but that I would contact urology.  I contacted on-call urologist, Dr. Wagner, who states that since patient is able to void, no intervention is indicated.  He recommended clinic follow-up with urology in the coming days.  We did ultrasound patient's bladder and he was noted to be able to void.  No emergency intervention indicated. He is in stable condition at the time of  discharge, indications for return to the ED were discussed as well as follow up. All questions were answered and he is in agreement with the plan.        Diagnosis:    ICD-10-CM    1. Gross hematuria  R31.0        Discharge Medications:  New Prescriptions    CEPHALEXIN (KEFLEX) 500 MG CAPSULE    Take 1 capsule (500 mg) by mouth 2 times daily for 7 days       Scribe Disclosure:  PATRICIA Heather Garciao, am serving as a scribe at 10:15 AM on 9/5/2021 to document services personally performed by Steven Flores MD based on my observations and the provider's statements to me.            Steven Flores MD  09/05/21 1767

## 2021-10-09 ENCOUNTER — HEALTH MAINTENANCE LETTER (OUTPATIENT)
Age: 51
End: 2021-10-09

## 2022-07-10 ENCOUNTER — HEALTH MAINTENANCE LETTER (OUTPATIENT)
Age: 52
End: 2022-07-10

## 2022-09-11 ENCOUNTER — HEALTH MAINTENANCE LETTER (OUTPATIENT)
Age: 52
End: 2022-09-11

## 2023-07-29 ENCOUNTER — HEALTH MAINTENANCE LETTER (OUTPATIENT)
Age: 53
End: 2023-07-29

## (undated) DEVICE — EVACUATOR BLADDER UROVAC LATEX M0067301250

## (undated) DEVICE — ESU ELEC LOOP 24FR 20750G

## (undated) DEVICE — ESU ELEC COAGULATE 24FR POINTED  27050L-S

## (undated) DEVICE — WIPES FOLEY CARE SURESTEP PROVON DFC100

## (undated) DEVICE — BAG CYSTO TABLE DRAIN

## (undated) DEVICE — PACK TUR CUSTOM SBA15RUFSE

## (undated) DEVICE — JELLY LUBRICATING SURGILUBE 4OZ TUBE

## (undated) DEVICE — WIRE GUIDE AMPLATZ SUPER STIFF 0.035"X145CM 46-524

## (undated) DEVICE — ESU ELEC ROLLERBALL 24FR 3MM  27050N

## (undated) DEVICE — SOL GLYCINE 1.5% 3000ML BAG 2B7317

## (undated) DEVICE — CATH FOLEY 3WAY 22FR 30ML LATEX 0167SI22

## (undated) DEVICE — SOL WATER IRRIG 3000ML BAG 2B7117

## (undated) DEVICE — SOL WATER IRRIG 1000ML BOTTLE 2F7114

## (undated) DEVICE — PAD CHUX UNDERPAD 23X24" 7136

## (undated) DEVICE — DECANTER BAG 2002S

## (undated) DEVICE — CABLE HIGH FREQEUCY STERILE 8MM PLUG 300CM 277KB-D/10

## (undated) DEVICE — ESU GROUND PAD UNIVERSAL W/O CORD

## (undated) DEVICE — GLOVE PROTEXIS W/NEU-THERA 7.5  2D73TE75

## (undated) DEVICE — BAG URINARY DRAIN 4000ML LF 153509

## (undated) RX ORDER — ONDANSETRON 2 MG/ML
INJECTION INTRAMUSCULAR; INTRAVENOUS
Status: DISPENSED
Start: 2021-06-15

## (undated) RX ORDER — FENTANYL CITRATE 50 UG/ML
INJECTION, SOLUTION INTRAMUSCULAR; INTRAVENOUS
Status: DISPENSED
Start: 2021-06-15

## (undated) RX ORDER — PROPOFOL 10 MG/ML
INJECTION, EMULSION INTRAVENOUS
Status: DISPENSED
Start: 2021-06-15

## (undated) RX ORDER — ATROPA BELLADONNA AND OPIUM 16.2; 3 MG/1; MG/1
SUPPOSITORY RECTAL
Status: DISPENSED
Start: 2021-06-15

## (undated) RX ORDER — CEFAZOLIN SODIUM 2 G/100ML
INJECTION, SOLUTION INTRAVENOUS
Status: DISPENSED
Start: 2021-06-15